# Patient Record
(demographics unavailable — no encounter records)

---

## 2018-11-29 NOTE — PRG
DATE OF SERVICE:  2018



PRIMARY OBSTETRICIAN:  Dr. Modesto Perera.



CHIEF COMPLAINT:  Abdominal pain and leakage of fluid.



HISTORY OF PRESENT ILLNESS:  The patient is a 34-year-old G4, P2 female with an

intrauterine pregnancy at 37 weeks and 5 days, presenting with 1-day history of

leakage of fluid and increased abdominal pain.  The patient reports that she was

going to the bathroom and felt like she had a lot more fluid leak out than just what

she thought she was seeing.  The patient has since not had any more leakage of

fluids, but is here for evaluation.  She also reports she has been having uterine

contractions for the last several weeks and just became more intense today.  The

patient denies fever.  She does report headache.  Denies falls, chest pain.  She

does report intermittent shortness of breath that she attributes to the present

state.  Reports nausea with vomiting in the morning, which has been present

throughout her pregnancy.  Reports intermittent constipation.  Denies diarrhea.

Denies any rashes, hip problems, knee problems or muscle weakness.  Denies vaginal

bleeding, however, reports for leakage of fluid and denies urinary urgency or

frequency. 



PAST MEDICAL HISTORY:  History of depression.



PAST SURGICAL HISTORY:  She has had one prior , tubal ligation with

subsequent reversal and a tonsillectomy. 



SOCIAL HISTORY:  She denies drug, alcohol, or tobacco use.



ALLERGIES:  NO KNOWN DRUG ALLERGIES.



MEDICATIONS:  Zoloft and prenatal vitamins.



OB LABS:  Blood type is 0 positive.  RPR is nonreactive, HIV is nonreactive as well

in the third trimester.  Her 1-hour Glucola was 108.  Antibody screen is negative.

She is rubella immune.  Hepatitis B surface antigen is negative in the first

trimester.  RPR is negative in the first trimester.  HIV is negative in the first

trimester. 



REVIEW OF SYSTEMS:  Per HPI.



PHYSICAL EXAMINATION:

VITAL SIGNS:  Blood pressure 120/76, heart rate of 109, respiratory rate of 18,

temperature 98.6. 

GENERAL:  She appears to be in no acute distress.  She is alert and oriented,

cooperative, and pleasant to interact with. 

HEAD:  Normocephalic, atraumatic. 

LUNGS:  Clear to auscultation bilaterally. 

HEART:  Regular rate and rhythm. 

ABDOMEN:  Soft, gravid, and nontender. 

EXTREMITIES:  Nontender, nonedematous. 

CERVICAL:  Per nursing staff, fingertip thick and -4 station. 



Fetal heart tracing for abdominal pain in pregnancy, fetus noted to have baseline in

the 130s with moderate long-term variability, positive 15 x 15 accelerations, and no

decelerations.  Tocometer shows occasional contractions about every 10 to 15 minutes

with some irritability. 



AmniSure test is negative, reported by her primary OB this evening are bile acids

that were drawn for excessive itching and noted to be borderline, otherwise has

normal LFTs. 



ASSESSMENT AND PLAN:  The patient is a 34-year-old female with an intrauterine

pregnancy at 37 weeks and 5 days, who is presenting with uterine contractions and

leakage of fluids.  There is no evidence at this time of rupture of membranes or

labor.  The patient is being discharged to home.  Dr. Perera has been made aware and

is scheduling her  in the near future.  Fetus is reassuring and reactive. 







Job ID:  [id number]

## 2018-11-30 NOTE — OP
DATE OF PROCEDURE:  2018



PREOPERATIVE DIAGNOSES:  

1. A 34-year-old, G4, P2-0-1-2, at 38 weeks with previous  section and

history of macrosomia. 

2. Cholestasis of pregnancy.

3. Elevated blood pressures at term.



POSTOPERATIVE DIAGNOSES:  

1. A 34-year-old, G4, P2-0-1-2, at 38 weeks with previous  section and

history of macrosomia. 

2. Cholestasis of pregnancy.

3. Elevated blood pressures at term.



PROCEDURE PERFORMED:  Repeat low-transverse  section with delivery assisted

by vacuum cup. 



ASSISTANT:  Nereyda Ratliff MD



ANESTHESIA:  Spinal.



COMPLICATIONS:  None.



ESTIMATED BLOOD LOSS:  750 mL.



FINDINGS:  

1. Low transverse hysterotomy without extension.

2. Vigorous female infant, Apgars 8 and 9, weight 9 pounds and 14 ounces, to 

nursery. 

3. Normal appearing uterus, tubes, and ovaries bilaterally.

4. No intraabdominal adhesive disease.

5. Surgical site, hemostatic.

6. Fundus firm after delivery of placenta and closure of hysterotomy.



DESCRIPTION OF PROCEDURE:  The patient was taken back to the OR with IV fluids

running.  She sat up at the bedside for spinal anesthesia and was placed in dorsal

supine position with a left lateral tilt after the spinal anesthesia was placed.

Wilson catheter was placed using sterile technique.  A 2 g of Ancef were given.  The

abdomen was prepped and draped in normal fashion for  section.  The surgeons

were scrubbed in.  The abdomen was tested and the anesthesia was found to be

adequate.  A Pfannenstiel skin incision was made with a scalpel.  The skin incision

was carried down through the subcutaneous tissue to the fascia.  Once the fascia was

reached, it was incised in the midline, extended superolaterally using curved West

scissors.  Kocher clamps were placed at the superior border of the fascia, which was

sharply and bluntly dissected off the rectus abdominis muscles.  This was also

completed at the inferior edge of the fascia, dissecting off the rectus abdominis

muscles to allow a quick space for delivery of the infant.  The rectus muscles were

bluntly  in the midline.  The peritoneum was blunted entered and stretched

laterally.  An Eduin O retractor was placed into the peritoneal cavity for

retraction, visualization, and protection of the wound.  A bladder flap was created

and the bladder was dissected away from the planned hysterotomy site.  Low

transverse hysterotomy was made with a scalpel.  The hysterotomy was bluntly entered

and stretched using a Crede maneuver.  Amniotomy was performed with copious clear

fluid noted.  Due to maternal obesity, fundal pressure was not effective in bringing

the infant's head out of the hysterotomy.  For this reason, a mushroom-shaped vacuum

cup was placed over the flexion point of the fetal head.  With gentle traction, the

head was delivered without difficulty through the hysterotomy.  There was one

pop-off due to poor suction contact on the first application.  After the head was

delivered, the shoulders, and the body were delivered without difficulty.  The nose

and mouth were suctioned.  The cord was doubly clamped and cut and the infant was

handed off to the special care nurses in attendance.  Cord blood was collected.  The

placenta was delivered.  The uterus was exteriorized and massage firm and cleared of

clot and debris.  The uterus was returned to the abdominal cavity.  The hysterotomy

was inspected with no extension noted.  The hysterotomy was then closed with

Monocryl suture in a running locked fashion.  After the hysterotomy was closed, it

was inspected with no areas of bleeding noted.  The fundus was palpated and noted to

be firm.  The Eduin O retractor was removed from the abdominal cavity and the first

count was current.  The muscle and fascia were inspected with no areas of bleeding

noted.  The fascia was reapproximated with PDS suture from corner to corner and tied

subsequently in the midline.  Subcutaneous tissue was then irrigated and dried.  It

was 

inspected with no bleeding noted.  Subcutaneous tissue was reapproximated with plain

gut suture.  The skin was closed with 4-0 Monocryl and dressed with Dermabond

dressing.  The patient tolerated the procedure well.  The counts were correct and

there were no complications. 







Job ID:  [id number]

## 2018-11-30 NOTE — PDOC.OPDEL
OB Operative/Delivery Note


Delivery Dr/Surgeon: Trever


Assist: Evy


Pre-Delivery Diagnosis: scheduled  section (cholestasis of preg, 

elevated BP @ 38 weeks)


Procedure/Post Delivery Dx: repeat low transverse CS


Weeks gestation: 38


Anesthesia: spinal





- Findings


  ** A


Sex: female


Weight: 9 lb 14 oz


Apgar - 1 min: 8


Apgar - 5 min: 9





- Additional Findings/Plan


Placenta delivered: spontaneous


 findings: low transverse hysterotomy without extension, normal uterus, 

normal tubes, normal ovaries


Estimated blood loss: 750ml


Compilations/Other Findings: 





Vacuum assisted delivery of head


Post delivery plan: routine recovery

## 2018-11-30 NOTE — PDOC.LDHP
Labor and Delivery H&P


Chief complaint: scheduled  section


HPI: 





Pt is a 35yo  @ 38 weeks here for RCS.  Pt was scheduled for 39 weeks 

but over the last week has complained of increased itching all over her body, 

reports rubbing lip balm on her neck in effort to help the itching, and with a 

bile acid of 10 last week.  Yesterday in the office the patient had /90 

on intake and then again on repeat BP.  With elevated BP and progressive 

itching we discussed the indication for delivery. 


Current gestational age (weeks): 38


Due date: 18


Grav: 4


Para: 2


OB History Details: 





hx of macrosomia, hx of  x 1 and CS x 1


hx of BTL w reversal and spont pregnancy


Current pregnancy complications: gestational hypertension, other (cholestasis)


Abnormal US findings: No


Past Medical History: 





depression


obesity


Current medications: pre- vitamins, other (zoloft 100mg a day)


Previous surgical history: low tranverse CS, other (BTL and BTL reversal)


Allergies/Adverse Reactions: 


 Allergies











Allergy/AdvReac Type Severity Reaction Status Date / Time


 


No Known Allergies Allergy   Verified 18 06:30











Social history: none





- Physical Exam


Vital signs reviewed and normal: yes


General: resting


Heart: RRR


Lungs: CTAB


Abdomen: gravid


Extremeties: no edema


FHT: variability present





- OB Labs


Blood type: O


RH: positive


Antibody Screen: negative


HIV: negative


RPR: negative


HEPSAg: negative


1 hour GCT: negative


GBS: negative


Rubella: immune





- Assessment


L&D Assessment: scheduled repeat  section





- Plan


Plan: admit to L&D, to OR for  section, informed consent obtained, 

anesthesia consult for pain management


-: 





A/P: 35yo @ 38 weeks with hx of macrosomia, desires RCS, cholestasis of 

pregnancy and suspected GHTN.

## 2018-12-01 NOTE — PDOC.PP
Post Partum Progress Note


Post Partum Day #: 1


Subjective: 


Patient denies any complaints. She is ambulating, voiding, and tolerating PO 

well. She reports she hasn't eaten a full meal of solid food yet, but plans to 

do this for breakfast. She reports some mild abdominal pain, mostly with 

movement. Reports minimal vaginal bleeding. She is trying to breast feed, 

however her baby is in the NICU and is not always latching well. She denies 

flatus. 


PO intake tolerated: yes


Flatus: no


Ambulation: yes


 Vital Signs (12 hours)











  Temp Pulse Resp BP Pulse Ox


 


 18 04:00  97.8 F  91  16  114/67 


 


 18 23:56  98.2 F  92  16  110/59 L 


 


 18 23:00  98.2 F  95  20  110/60 


 


 18 20:00  97.7 F  95  20  121/56 L  96








 Weight











Weight                         124.738 kg

















- Physical Examination


General: NAD


Cardiovascular: no m/r/g, RRR


Respiratory: clear to auscultation bilaterally, non-labored breathing


Abdominal: + bowel sounds, lochia (minimal), no distention, appropriately TTP


Fundus firm & at: 1 cm below the umbilicus


Skin: CS incision dry & intact, no rash


Neurological: no gross focal deficits


Psychiatric: A&Ox3, normal affect


Result Diagrams: 


 18 06:01





Additional Labs: 


 Post Partum Labs











Blood Type  O POSITIVE   18  06:30    


 


Hep Bs Antigen  Non-Reactive S/CO (NonReactive)   18  06:01    











(1) Term pregnancy delivered


Code(s): O80 - ENCOUNTER FOR FULL-TERM UNCOMPLICATED DELIVERY   Status: Acute   

Comment: 35 y/o  delivered via rLTCS at 38 WGA


-Continue routine post-partum care


-Pain control with ibuprofen and norco prn


-Encourage ambulation


-Encourage breast feeding


-ADAT   





(2) Cholestasis during pregnancy


Code(s): O26.619 - LIVER AND BILIARY TRACT DISORD IN PREGNANCY, UNSP TRIMESTER; 

K83.1 - OBSTRUCTION OF BILE DUCT   Status: Acute   


Qualifiers: 


   Trimester: unspecified trimester   Qualified Code(s): O26.619 - Liver and 

biliary tract disorders in pregnancy, unspecified trimester; K83.1 - 

Obstruction of bile duct   


Comment: s/p delivery